# Patient Record
Sex: MALE | Race: BLACK OR AFRICAN AMERICAN | URBAN - METROPOLITAN AREA
[De-identification: names, ages, dates, MRNs, and addresses within clinical notes are randomized per-mention and may not be internally consistent; named-entity substitution may affect disease eponyms.]

---

## 2023-06-12 ENCOUNTER — APPOINTMENT (RX ONLY)
Dept: URBAN - METROPOLITAN AREA CLINIC 329 | Facility: CLINIC | Age: 33
Setting detail: DERMATOLOGY
End: 2023-06-12

## 2023-06-12 DIAGNOSIS — L21.8 OTHER SEBORRHEIC DERMATITIS: ICD-10-CM | Status: INADEQUATELY CONTROLLED

## 2023-06-12 PROCEDURE — ? PRESCRIPTION MEDICATION MANAGEMENT

## 2023-06-12 PROCEDURE — ? MDM - TREATMENT GOALS

## 2023-06-12 PROCEDURE — ? COUNSELING

## 2023-06-12 PROCEDURE — 99204 OFFICE O/P NEW MOD 45 MIN: CPT

## 2023-06-12 PROCEDURE — ? FULL BODY SKIN EXAM - DECLINED

## 2023-06-12 PROCEDURE — ? PRESCRIPTION

## 2023-06-12 RX ORDER — KETOCONAZOLE 20 MG/ML
SHAMPOO, SUSPENSION TOPICAL
Qty: 120 | Refills: 6 | Status: ERX | COMMUNITY
Start: 2023-06-12

## 2023-06-12 RX ORDER — FLUOCINOLONE ACETONIDE 0.11 MG/ML
OIL TOPICAL AS DIRECTED
Qty: 118.28 | Refills: 6 | Status: ERX | COMMUNITY
Start: 2023-06-12

## 2023-06-12 RX ADMIN — FLUOCINOLONE ACETONIDE: 0.11 OIL TOPICAL at 00:00

## 2023-06-12 RX ADMIN — KETOCONAZOLE: 20 SHAMPOO, SUSPENSION TOPICAL at 00:00

## 2023-06-12 ASSESSMENT — LOCATION SIMPLE DESCRIPTION DERM: LOCATION SIMPLE: LEFT SCALP

## 2023-06-12 ASSESSMENT — LOCATION DETAILED DESCRIPTION DERM: LOCATION DETAILED: LEFT MEDIAL FRONTAL SCALP

## 2023-06-12 ASSESSMENT — LOCATION ZONE DERM: LOCATION ZONE: SCALP

## 2023-06-12 NOTE — PROCEDURE: PRESCRIPTION MEDICATION MANAGEMENT
Render In Strict Bullet Format?: No
Discontinue Regimen: Ciclopirox solution
Detail Level: Zone
Initiate Treatment: Derma-smooth scalp oil daily x 2 weeks, then as needed.\\nKetoconazole shampoo daily x 2 weeks, then 1-2 times per week for maintenance

## 2024-08-06 ENCOUNTER — APPOINTMENT (RX ONLY)
Dept: URBAN - METROPOLITAN AREA CLINIC 23 | Facility: CLINIC | Age: 34
Setting detail: DERMATOLOGY
End: 2024-08-06

## 2024-08-06 DIAGNOSIS — L21.8 OTHER SEBORRHEIC DERMATITIS: ICD-10-CM | Status: INADEQUATELY CONTROLLED

## 2024-08-06 PROCEDURE — ? PRESCRIPTION MEDICATION MANAGEMENT

## 2024-08-06 PROCEDURE — ? PRESCRIPTION

## 2024-08-06 PROCEDURE — 99204 OFFICE O/P NEW MOD 45 MIN: CPT

## 2024-08-06 PROCEDURE — ? COUNSELING

## 2024-08-06 RX ORDER — BETAMETHASONE DIPROPIONATE 0.5 MG/ML
LOTION TOPICAL
Qty: 60 | Refills: 4 | Status: ERX | COMMUNITY
Start: 2024-08-06

## 2024-08-06 RX ORDER — KETOCONAZOLE 20 MG/ML
SHAMPOO, SUSPENSION TOPICAL WEEKLY
Qty: 120 | Refills: 5 | Status: ERX | COMMUNITY
Start: 2024-08-06

## 2024-08-06 RX ADMIN — KETOCONAZOLE: 20 SHAMPOO, SUSPENSION TOPICAL at 00:00

## 2024-08-06 RX ADMIN — BETAMETHASONE DIPROPIONATE: 0.5 LOTION TOPICAL at 00:00

## 2024-08-06 ASSESSMENT — LOCATION DETAILED DESCRIPTION DERM
LOCATION DETAILED: RIGHT SUPERIOR PARIETAL SCALP
LOCATION DETAILED: LEFT SUPERIOR MEDIAL FOREHEAD

## 2024-08-06 ASSESSMENT — LOCATION ZONE DERM
LOCATION ZONE: SCALP
LOCATION ZONE: FACE

## 2024-08-06 ASSESSMENT — LOCATION SIMPLE DESCRIPTION DERM
LOCATION SIMPLE: LEFT FOREHEAD
LOCATION SIMPLE: SCALP

## 2024-08-06 NOTE — PROCEDURE: PRESCRIPTION MEDICATION MANAGEMENT
Initiate Treatment: ketoconazole 2 % shampoo Weekly\\nSig: Wash scalp 2-3x weekly. Let sit for 5-7 minutes them rinse\\n\\nbetamethasone dipropionate 0.05 % lotion \\nSig: Apply to the affected areas on the scalp twice daily as needed
Detail Level: Zone
Render In Strict Bullet Format?: No

## 2025-05-04 ENCOUNTER — HOSPITAL ENCOUNTER (EMERGENCY)
Age: 35
Discharge: HOME | End: 2025-05-04

## 2025-05-04 VITALS
OXYGEN SATURATION: 100 % | WEIGHT: 150 LBS | HEIGHT: 69 IN | RESPIRATION RATE: 16 BRPM | TEMPERATURE: 98.5 F | DIASTOLIC BLOOD PRESSURE: 83 MMHG | SYSTOLIC BLOOD PRESSURE: 132 MMHG | HEART RATE: 92 BPM | BODY MASS INDEX: 22.22 KG/M2

## 2025-05-04 DIAGNOSIS — R51.9 ACUTE NONINTRACTABLE HEADACHE, UNSPECIFIED HEADACHE TYPE: Primary | ICD-10-CM

## 2025-05-04 PROCEDURE — 6360000002 HC RX W HCPCS

## 2025-05-04 PROCEDURE — 6370000000 HC RX 637 (ALT 250 FOR IP)

## 2025-05-04 PROCEDURE — 99284 EMERGENCY DEPT VISIT MOD MDM: CPT

## 2025-05-04 PROCEDURE — 96372 THER/PROPH/DIAG INJ SC/IM: CPT

## 2025-05-04 RX ORDER — METOCLOPRAMIDE 10 MG/1
10 TABLET ORAL
Status: COMPLETED | OUTPATIENT
Start: 2025-05-04 | End: 2025-05-04

## 2025-05-04 RX ORDER — KETOROLAC TROMETHAMINE 15 MG/ML
15 INJECTION, SOLUTION INTRAMUSCULAR; INTRAVENOUS ONCE
Status: COMPLETED | OUTPATIENT
Start: 2025-05-04 | End: 2025-05-04

## 2025-05-04 RX ORDER — DIPHENHYDRAMINE HCL 25 MG
25 CAPSULE ORAL
Status: COMPLETED | OUTPATIENT
Start: 2025-05-04 | End: 2025-05-04

## 2025-05-04 RX ORDER — DEXAMETHASONE SODIUM PHOSPHATE 10 MG/ML
10 INJECTION, SOLUTION INTRA-ARTICULAR; INTRALESIONAL; INTRAMUSCULAR; INTRAVENOUS; SOFT TISSUE
Status: COMPLETED | OUTPATIENT
Start: 2025-05-04 | End: 2025-05-04

## 2025-05-04 RX ADMIN — KETOROLAC TROMETHAMINE 15 MG: 15 INJECTION, SOLUTION INTRAMUSCULAR; INTRAVENOUS at 21:41

## 2025-05-04 RX ADMIN — METOCLOPRAMIDE 10 MG: 10 TABLET ORAL at 21:40

## 2025-05-04 RX ADMIN — DEXAMETHASONE SODIUM PHOSPHATE 10 MG: 10 INJECTION INTRAMUSCULAR; INTRAVENOUS at 21:41

## 2025-05-04 RX ADMIN — DIPHENHYDRAMINE HCL 25 MG: 25 CAPSULE ORAL at 21:40

## 2025-05-04 ASSESSMENT — LIFESTYLE VARIABLES
HOW OFTEN DO YOU HAVE A DRINK CONTAINING ALCOHOL: MONTHLY OR LESS
HOW MANY STANDARD DRINKS CONTAINING ALCOHOL DO YOU HAVE ON A TYPICAL DAY: 1 OR 2

## 2025-05-04 ASSESSMENT — PAIN DESCRIPTION - DESCRIPTORS: DESCRIPTORS: THROBBING

## 2025-05-04 ASSESSMENT — PAIN SCALES - GENERAL: PAINLEVEL_OUTOF10: 10

## 2025-05-04 ASSESSMENT — PAIN DESCRIPTION - ORIENTATION: ORIENTATION: LEFT

## 2025-05-04 ASSESSMENT — PAIN DESCRIPTION - LOCATION: LOCATION: HEAD

## 2025-05-05 NOTE — ED TRIAGE NOTES
Pt c/o throbbing head ache on the left side of his head that has been going on for 2 weeks. Pt stated he thought it may have been his sinuses so he took sinus medication and ibuprofen but still felt no relief. Pmh of migraines.

## 2025-05-05 NOTE — DISCHARGE INSTRUCTIONS
Your headache improved with the medication we gave you in the ER.  I recommend that you follow-up with your family doctor, they may want to start you on some new medication that will help with your headaches.  If you experience any new or worsening symptoms in the meantime you may return to the ER for reevaluation.    It was a pleasure taking care of you today.  We sincerely hope that you feel better soon.

## 2025-05-05 NOTE — ED TRIAGE NOTES
Patient presented from work with complaints of headache that started on 4/23, states the headaches are \"easing up a little bit and then lasting for hours.\" Denies N/v and changes in vision. Denies hitting head or any trauma.

## 2025-05-05 NOTE — ED PROVIDER NOTES
Emergency Department Provider Note       [facility] EM[facility]GENCY DEPT   PCP: No primary care provider on file.   Age: 34 y.o.   Sex: male     DISPOSITION Decision To Discharge 05/04/2025 10:39:34 PM    ICD-10-CM    1. Acute nonintractable headache, unspecified headache type  R51.9           Medical Decision Making     Patient presents with 2 week history of headache.  Patient is well-appearing.  Vital signs are stable. He is afebrile.  No headache red flags.  Neuroexam without evidence of meningismus, AMS, or focal neurologic findings so doubt meningitis, encephalitis, stroke.  Presentation not consistent with acute intracranial bleed.  No history of trauma so doubt ICH.  Pain was controlled with headache cocktail and patient was discharged home. Patient is established with primary care for follow-up.  Return precautions discussed.  Patient verbalized understanding is agreeable for discharge home.       1 or more acute illnesses that pose a threat to life or bodily function.   Over the counter drug management performed.  Patient was discharged risks and benefits of hospitalization were considered.  Shared medical decision making was utilized in creating the patients health plan today.  I independently ordered and reviewed each unique test.                         History     Patient is a 34-year-old male with no significant past medical history who presents to the ER with chief complaint of headache.  Patient states he has been having intermittent headaches for the past 2 weeks.  He states he had a history of migraines when he was younger.  He denies any neck pain, fever, vision changes, chest pain, shortness of breath, slurred speech, numbness, tingling.  No nausea or vomiting.  Does report some light sensitivity.  States he has tried multiple over-the-counter medications without positive effect.    The history is provided by the patient.     Physical Exam     Vitals signs and nursing note reviewed:  Vitals:    05/04/25